# Patient Record
(demographics unavailable — no encounter records)

---

## 2025-02-27 NOTE — PHYSICAL EXAM

## 2025-02-27 NOTE — HISTORY OF PRESENT ILLNESS
[FreeTextEntry1] : 61 yo male hospitalized at [facility] in 11/18 with acute visual defects. Head CT showed bilateral PCA infarcts.  Patient noted to have rapid atrial fibrillation. Echo showed normal EF and LV function.  Now persistent AF. No palpitations, remains active but not exercising. No recent chest pain, or dyspnea. Claims he is compliant with all meds.  On DOAC's, h/o recurrent internal hemorrhoids on colonoscopy 2/20. s/p Banding by Proctologist. BP's high, not self measuring anymore. Last labs reviewed with patient.

## 2025-02-27 NOTE — REASON FOR VISIT
[Follow-Up - From Hospitalization] : follow-up of a recent hospitalization for [Atrial Fibrillation] : atrial fibrillation [FreeTextEntry1] : CVA

## 2025-02-27 NOTE — CARDIOLOGY SUMMARY
[No Ischemia] : no Ischemia [No Exercise Ind Arr] : no exercise induced arrhythmias [No Symptoms] : no Symptoms [___] : [unfilled] [LVEF ___%] : LVEF [unfilled]% [de-identified] : 2/27/25, Atrial fibrillation -irregular conduction -frequent multiform ectopic ventricular beats, -Nonspecific T-abnormality. 6/19/24, Atrial fibrillation , -Nonspecific T-abnormality. 12/6/23, Atrial fibrillation -irregular conduction, -Nonspecific T-abnormality. 5/22/22, Sinus  Rhythm  - occasional ectopic ventricular beat  , Voltage criteria for LVH  (R(I)+S(III) exceeds 2.50 mV)  -Voltage criteria w/o ST/T abnormality may be normal.   -  Nonspecific T-abnormality.  .8/6/21, AF [de-identified] : 4/1/19, small fixed apical and apical inferior defect (attenuation?) [de-identified] : 7/11/22, EF 59%, trace TR,PI

## 2025-02-27 NOTE — REVIEW OF SYSTEMS
Detail Level: Generalized Detail Level: Zone Detail Level: Detailed Sunscreen Recommendations: Suggest of sunscreen of SPF 30 or higher. [Negative] : Heme/Lymph

## 2025-02-27 NOTE — DISCUSSION/SUMMARY
[___ Month(s)] : in [unfilled] month(s) [EKG obtained to assist in diagnosis and management of assessed problem(s)] : EKG obtained to assist in diagnosis and management of assessed problem(s) [FreeTextEntry1] : 60-year-old male with persistent AF. 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  Patient tolerating anticoagulation; labs checked at (new) PCP, reportedly normal.  Given a [id number] score of 3, patient remains at high risk for embolic event; would continue DOAC indefinitely as long as there are no overt C/I.  Recommend increased activity and dietary modification for borderline diabetes.  Repeat CBC in 3 months 2/2 decreased H/H.

## 2025-03-13 NOTE — PHYSICAL EXAM
[Well Developed] : well developed [Well Nourished] : well nourished [No Acute Distress] : no acute distress [Normal Venous Pressure] : normal venous pressure [No Carotid Bruit] : no carotid bruit [No Murmur] : no murmur [No Rub] : no rub [No Gallop] : no gallop [Clear Lung Fields] : clear lung fields [Good Air Entry] : good air entry [No Respiratory Distress] : no respiratory distress  [Normal Bowel Sounds] : normal bowel sounds [Normal Gait] : normal gait [No Edema] : no edema [No Cyanosis] : no cyanosis [No Clubbing] : no clubbing [No Varicosities] : no varicosities [Moves all extremities] : moves all extremities [No Focal Deficits] : no focal deficits [Normal Speech] : normal speech [Alert and Oriented] : alert and oriented [Normal memory] : normal memory [de-identified] : Irreg Irreg,

## 2025-03-13 NOTE — CARDIOLOGY SUMMARY
[No Ischemia] : no Ischemia [No Exercise Ind Arr] : no exercise induced arrhythmias [No Symptoms] : no Symptoms [___] : [unfilled] [LVEF ___%] : LVEF [unfilled]% [de-identified] : 2/27/25, Atrial fibrillation -irregular conduction -frequent multiform ectopic ventricular beats, -Nonspecific T-abnormality. 6/19/24, Atrial fibrillation , -Nonspecific T-abnormality. 12/6/23, Atrial fibrillation -irregular conduction, -Nonspecific T-abnormality. 5/22/22, Sinus  Rhythm  - occasional ectopic ventricular beat  , Voltage criteria for LVH  (R(I)+S(III) exceeds 2.50 mV)  -Voltage criteria w/o ST/T abnormality may be normal.   -  Nonspecific T-abnormality.  .8/6/21, AF [de-identified] : 4/1/19, small fixed apical and apical inferior defect (attenuation?) [de-identified] : 7/11/22, EF 59%, trace TR,PI

## 2025-03-13 NOTE — HISTORY OF PRESENT ILLNESS
[FreeTextEntry1] : 59 yo male hospitalized at [facility] in 11/18 with acute visual defects. Head CT showed bilateral PCA infarcts.  Patient noted to have rapid atrial fibrillation. Echo showed normal EF and LV function.  Now persistent AF. No palpitations, remains active but not exercising. No recent chest pain, or dyspnea. Claims he is compliant with all meds.  On DOAC's, h/o recurrent internal hemorrhoids on colonoscopy 2/20. s/p Banding by Proctologist. BP's high, not self measuring anymore. Last labs reviewed with patient.  03/13/2025 Patient comes in for BPC and general follow up  No Interval symptoms  Tolerating medications well without significant side effects Will Need interval ischemia evaluation next visit.